# Patient Record
Sex: FEMALE | Race: OTHER
[De-identification: names, ages, dates, MRNs, and addresses within clinical notes are randomized per-mention and may not be internally consistent; named-entity substitution may affect disease eponyms.]

---

## 2019-06-02 ENCOUNTER — HOSPITAL ENCOUNTER (EMERGENCY)
Dept: HOSPITAL 61 - ER | Age: 29
Discharge: HOME | End: 2019-06-02
Payer: COMMERCIAL

## 2019-06-02 VITALS — WEIGHT: 160 LBS | BODY MASS INDEX: 29.44 KG/M2 | HEIGHT: 62 IN

## 2019-06-02 VITALS
DIASTOLIC BLOOD PRESSURE: 62 MMHG | DIASTOLIC BLOOD PRESSURE: 62 MMHG | SYSTOLIC BLOOD PRESSURE: 109 MMHG | SYSTOLIC BLOOD PRESSURE: 109 MMHG | SYSTOLIC BLOOD PRESSURE: 109 MMHG | SYSTOLIC BLOOD PRESSURE: 109 MMHG | DIASTOLIC BLOOD PRESSURE: 62 MMHG | DIASTOLIC BLOOD PRESSURE: 62 MMHG

## 2019-06-02 DIAGNOSIS — Z3A.01: ICD-10-CM

## 2019-06-02 DIAGNOSIS — O20.0: Primary | ICD-10-CM

## 2019-06-02 LAB
ANION GAP SERPL CALC-SCNC: 13 MMOL/L (ref 6–14)
APTT PPP: YELLOW S
BACTERIA #/AREA URNS HPF: (no result) /HPF
BASOPHILS # BLD AUTO: 0.1 X10^3/UL (ref 0–0.2)
BASOPHILS NFR BLD: 1 % (ref 0–3)
BILIRUB UR QL STRIP: NEGATIVE
BUN SERPL-MCNC: 13 MG/DL (ref 7–20)
CALCIUM SERPL-MCNC: 9.4 MG/DL (ref 8.5–10.1)
CHLORIDE SERPL-SCNC: 102 MMOL/L (ref 98–107)
CO2 SERPL-SCNC: 24 MMOL/L (ref 21–32)
CREAT SERPL-MCNC: 0.5 MG/DL (ref 0.6–1)
EOSINOPHIL NFR BLD: 0.1 X10^3/UL (ref 0–0.7)
EOSINOPHIL NFR BLD: 2 % (ref 0–3)
ERYTHROCYTE [DISTWIDTH] IN BLOOD BY AUTOMATED COUNT: 13.1 % (ref 11.5–14.5)
FIBRINOGEN PPP-MCNC: CLEAR MG/DL
GFR SERPLBLD BASED ON 1.73 SQ M-ARVRAT: 145.9 ML/MIN
GLUCOSE SERPL-MCNC: 96 MG/DL (ref 70–99)
HCT VFR BLD CALC: 42 % (ref 36–47)
HGB BLD-MCNC: 14.1 G/DL (ref 12–15.5)
LYMPHOCYTES # BLD: 2 X10^3/UL (ref 1–4.8)
LYMPHOCYTES NFR BLD AUTO: 25 % (ref 24–48)
MCH RBC QN AUTO: 30 PG (ref 25–35)
MCHC RBC AUTO-ENTMCNC: 34 G/DL (ref 31–37)
MCV RBC AUTO: 89 FL (ref 79–100)
MONO #: 0.6 X10^3/UL (ref 0–1.1)
MONOCYTES NFR BLD: 8 % (ref 0–9)
NEUT #: 5.1 X10^3UL (ref 1.8–7.7)
NEUTROPHILS NFR BLD AUTO: 64 % (ref 31–73)
NITRITE UR QL STRIP: NEGATIVE
PH UR STRIP: 6.5 [PH]
PLATELET # BLD AUTO: 290 X10^3/UL (ref 140–400)
POTASSIUM SERPL-SCNC: 4.3 MMOL/L (ref 3.5–5.1)
PROT UR STRIP-MCNC: NEGATIVE MG/DL
RBC # BLD AUTO: 4.7 X10^6/UL (ref 3.5–5.4)
RBC #/AREA URNS HPF: >40 /HPF (ref 0–2)
SODIUM SERPL-SCNC: 139 MMOL/L (ref 136–145)
SQUAMOUS #/AREA URNS LPF: (no result) /LPF
UROBILINOGEN UR-MCNC: 0.2 MG/DL
WBC # BLD AUTO: 7.9 X10^3/UL (ref 4–11)
WBC #/AREA URNS HPF: 0 /HPF (ref 0–4)

## 2019-06-02 PROCEDURE — 86901 BLOOD TYPING SEROLOGIC RH(D): CPT

## 2019-06-02 PROCEDURE — 76817 TRANSVAGINAL US OBSTETRIC: CPT

## 2019-06-02 PROCEDURE — 87491 CHLMYD TRACH DNA AMP PROBE: CPT

## 2019-06-02 PROCEDURE — 99285 EMERGENCY DEPT VISIT HI MDM: CPT

## 2019-06-02 PROCEDURE — 81001 URINALYSIS AUTO W/SCOPE: CPT

## 2019-06-02 PROCEDURE — 87591 N.GONORRHOEAE DNA AMP PROB: CPT

## 2019-06-02 PROCEDURE — 86850 RBC ANTIBODY SCREEN: CPT

## 2019-06-02 PROCEDURE — 80048 BASIC METABOLIC PNL TOTAL CA: CPT

## 2019-06-02 PROCEDURE — 86900 BLOOD TYPING SEROLOGIC ABO: CPT

## 2019-06-02 PROCEDURE — 76801 OB US < 14 WKS SINGLE FETUS: CPT

## 2019-06-02 PROCEDURE — 36415 COLL VENOUS BLD VENIPUNCTURE: CPT

## 2019-06-02 PROCEDURE — 85025 COMPLETE CBC W/AUTO DIFF WBC: CPT

## 2019-06-02 PROCEDURE — 81025 URINE PREGNANCY TEST: CPT

## 2019-06-02 PROCEDURE — 84702 CHORIONIC GONADOTROPIN TEST: CPT

## 2019-06-02 NOTE — PHYS DOC
Adult General


Chief Complaint


Chief Complaint:  VAGINAL BLEEDING PREGNANCY





[facility]


HPI





Patient is a 28  year old Marshallese-speaking female  1 para 0 currently 6 

weeks pregnant presenting to the ED today complaining of vaginal bleeding in 

pregnancy that began today after having sex this morning. Patient is also 

complaining of slight low back cramping. Denies any nausea vomiting. Denies any 

urgency frequency dysuria. Denies any concerns for STDs.





Interpretation was provided by the RN who speaks Marshallese


 (DAVID RICH)





Review of Systems


Review of Systems





Constitutional: Denies fever or chills []


Eyes: Denies change in visual acuity, redness, or eye pain []


HENT: Denies nasal congestion or sore throat []


Respiratory: Denies cough or shortness of breath []


Cardiovascular: No additional information not addressed in HPI []


GI: Reports vaginal bleeding in pregnancy, denies abdominal pain, nausea, 

vomiting, bloody stools or diarrhea []


: Denies dysuria or hematuria []


Musculoskeletal: Denies back pain or joint pain []


Integument: Denies rash or skin lesions []


Neurologic: Denies headache, focal weakness or sensory changes []








All other systems were reviewed and found to be within normal limits, except as 

documented in this note.


 (DAVID RICH)





Allergies


Allergies





Allergies








Coded Allergies Type Severity Reaction Last Updated Verified


 


  No Known Drug Allergies    19 No





 (JETT PIERRE MD)





Physical Exam


Physical Exam





Constitutional: Well developed, well nourished, no acute distress, non-toxic 

appearance. []


HENT: Normocephalic, atraumatic, bilateral external ears normal, oropharynx 

moist, no oral exudates, nose normal. []


Eyes: PERRLA, EOMI, conjunctiva normal, no discharge. [] 


Neck: Normal range of motion, no tenderness, supple, no stridor. [] 


Cardiovascular:Heart rate regular rhythm, no murmur []


Lungs & Thorax:  Bilateral breath sounds clear to auscultation []


Abdomen: Bowel sounds normal, soft, no tenderness, no masses, no pulsatile 

masses. [] 


Pelvic exam


External pelvic appears normal, cervix is visualized, closed, no CMT, no adnexal

 tenderness. Trace amount of bright red blood in the vaginal vault.


Skin: Warm, dry, no erythema, no rash. [] 


Back: No tenderness, no CVA tenderness. [] 


Extremities: No tenderness, no cyanosis, no clubbing, ROM intact, no edema. [] 


Neurologic: Alert and oriented X 3, normal motor function, normal sensory 

function, no focal deficits noted. []


Psychologic: Affect normal, judgement normal, mood normal. []


 (DAVID RICH)





Current Patient Data


Vital Signs





                                   Vital Signs








  Date Time  Temp Pulse Resp B/P (MAP) Pulse Ox O2 Delivery O2 Flow Rate FiO2


 


19 16:30  72 14 113/66 (82) 95 Room Air  


 


19 16:00 98.4       





 98.4       





 (JETT PIERRE MD)


Lab Values





                                Laboratory Tests








Test


 19


15:57 19


16:00 19


16:25


 


POC Urine HCG, Qualitative


 Hcg positive


(Negative) 


 





 


Urine Collection Type  Unknown   


 


Urine Color  Yellow   


 


Urine Clarity  Clear   


 


Urine pH  6.5   


 


Urine Specific Gravity  1.010   


 


Urine Protein


 


 Negative mg/dL


(NEG-TRACE) 





 


Urine Glucose (UA)


 


 Negative mg/dL


(NEG) 





 


Urine Ketones (Stick)


 


 Negative mg/dL


(NEG) 





 


Urine Blood  Large (NEG)   


 


Urine Nitrite


 


 Negative (NEG)


 





 


Urine Bilirubin


 


 Negative (NEG)


 





 


Urine Urobilinogen Dipstick


 


 0.2 mg/dL (0.2


mg/dL) 





 


Urine Leukocyte Esterase


 


 Negative (NEG)


 





 


Urine RBC


 


 >40 /HPF (0-2)


 





 


Urine WBC  0 /HPF (0-4)   


 


Urine Squamous Epithelial


Cells 


 Few /LPF  


 





 


Urine Bacteria


 


 Few /HPF


(0-FEW) 





 


White Blood Count


 


 


 7.9 x10^3/uL


(4.0-11.0)


 


Red Blood Count


 


 


 4.70 x10^6/uL


(3.50-5.40)


 


Hemoglobin


 


 


 14.1 g/dL


(12.0-15.5)


 


Hematocrit


 


 


 42.0 %


(36.0-47.0)


 


Mean Corpuscular Volume


 


 


 89 fL ()





 


Mean Corpuscular Hemoglobin   30 pg (25-35)  


 


Mean Corpuscular Hemoglobin


Concent 


 


 34 g/dL


(31-37)


 


Red Cell Distribution Width


 


 


 13.1 %


(11.5-14.5)


 


Platelet Count


 


 


 290 x10^3/uL


(140-400)


 


Neutrophils (%) (Auto)   64 % (31-73)  


 


Lymphocytes (%) (Auto)   25 % (24-48)  


 


Monocytes (%) (Auto)   8 % (0-9)  


 


Eosinophils (%) (Auto)   2 % (0-3)  


 


Basophils (%) (Auto)   1 % (0-3)  


 


Neutrophils # (Auto)


 


 


 5.1 x10^3uL


(1.8-7.7)


 


Lymphocytes # (Auto)


 


 


 2.0 x10^3/uL


(1.0-4.8)


 


Monocytes # (Auto)


 


 


 0.6 x10^3/uL


(0.0-1.1)


 


Eosinophils # (Auto)


 


 


 0.1 x10^3/uL


(0.0-0.7)


 


Basophils # (Auto)


 


 


 0.1 x10^3/uL


(0.0-0.2)


 


Maternal Serum HCG Beta


Subunit 


 


 90820 mIU/mL


(0-5)  H


 


Sodium Level


 


 


 139 mmol/L


(136-145)


 


Potassium Level


 


 


 4.3 mmol/L


(3.5-5.1)


 


Chloride Level


 


 


 102 mmol/L


()


 


Carbon Dioxide Level


 


 


 24 mmol/L


(21-32)


 


Anion Gap   13 (6-14)  


 


Blood Urea Nitrogen


 


 


 13 mg/dL


(7-20)


 


Creatinine


 


 


 0.5 mg/dL


(0.6-1.0)  L


 


Estimated GFR


(Cockcroft-Gault) 


 


 145.9  





 


Glucose Level


 


 


 96 mg/dL


(70-99)


 


Calcium Level


 


 


 9.4 mg/dL


(8.5-10.1)





                                Laboratory Tests


19 16:25








                                Laboratory Tests


19 16:25








 (JETT PIERRE MD)





EKG


EKG


[]


 (DAVID RICH)





Radiology/Procedures


Radiology/Procedures


[]


 (DAVID RICH)





Course & Med Decision Making


Course & Med Decision Making


Pertinent Labs and Imaging studies reviewed. (See chart for details)





This is a 28-year-old female patient presented to the ED today complaining of 

vaginal bleeding in pregnancy. Last menstrual cycle was 2019. Roughly 

6 weeks pregnant. Bleeding began today after having sex.





Patient has paperwork from her own doctor, she had a beta hCG done on May 21 

2019 which was 156.





OB ultrasound verbal report from radiology was noted for an IUP, heart rate 113.





CBC with normal WBC, normal hemoglobin and hematocrit, BMP with no acute blood 

group O+ urine analysis is negative for infection. Beta hCG 17,247.





Verbal results of wet prep from lab- negative 





Patient has good follow-up. She was instructed to maintain bedrest/pelvic right.

 Follow-up with her OB/GYN in the course of this week.














 (DAVID RICH)


Course & Med Decision Making





Staff Physician Addendum:


I was working in the ER during the course of this patient's visit.  I was 

available for consultation as needed, but I was not directly involved in the 

care of this patient.    


 (JETT PIERRE MD)


Dragon Disclaimer


Dragon Disclaimer


This electronic medical record was generated, in whole or in part, using a voice

 recognition dictation system.


 (DAVID RICH)





Departure


Departure


Impression:  


   Primary Impression:  


   Threatened miscarriage


Disposition:  01 HOME, SELF-CARE


Condition:  STABLE


Referrals:  


CATRACHITO HANDY Jr, MD


Follow-up with your OB/GYN or the provided OB/GYN in the course of this week


Patient Instructions:  Threatened Miscarriage, Easy-to-Read





Additional Instructions:  


You were evaluated in the emergency room and noted to be 6 weeks pregnant, we 

highly recommend you maintain pelvic this includes no sex, no strenous 

activities until seen by the OBGYN or your own doctor. Set up an appointment 

with your doctor as soon as you can.











DAVID RICH               2019 16:10


JETT PIERRE MD             2019 18:50

## 2019-06-03 NOTE — RAD
Examination: Obstetric ultrasound less than 14 weeks

 

HISTORY: History of vaginal bleeding

 

COMPARISON: None available

 

FINDINGS:

 

 

Uterus measures 7.8 x 5.5 x 3.9 cm. Cervical length measures 3 cm.

 

The right ovary measures 3.5 x 2.4 x 1.7 cm. Small corpus luteal cyst 

identified in the right ovary.

 

The left ovary measures 3.0 x 2.2 x 1.3 cm.

 

Intrauterine gestational sac and yolk sac identified. Fetal heart rate is 

113 bpm.

 

Gestational sac measures 1.2 cm corresponding to 6 weeks and 0 days. 

Crown-rump length measures 2.2 mm corresponding to 5 weeks and 5 days.

 

Gestational age by ultrasound is 5 weeks and 6 days with estimated 

delivery by ultrasound 1/27/2020.

 

Impression:

 

Single living intrauterine pregnancy.

 

Electronically signed by: Luis Antonio Flores MD (6/3/2019 11:20 AM) [facility]

## 2019-08-16 ENCOUNTER — HOSPITAL ENCOUNTER (OUTPATIENT)
Dept: HOSPITAL 61 - US | Age: 29
Discharge: HOME | End: 2019-08-16
Attending: OBSTETRICS & GYNECOLOGY
Payer: COMMERCIAL

## 2019-08-16 DIAGNOSIS — O26.842: Primary | ICD-10-CM

## 2019-08-16 DIAGNOSIS — Z3A.17: ICD-10-CM

## 2019-08-16 PROCEDURE — 76805 OB US >/= 14 WKS SNGL FETUS: CPT

## 2019-08-16 NOTE — RAD
Clinical indications: Uncertain gestational age. Evaluation for size and 

dates. 

 

Findings: A single intrauterine fetus is seen in breech position. Fetal 

heart rate is 185 beats per minute. 

BPD is 3.7 cm which equals 17 weeks 2 days.

HC is 13.50 cm which equals 17  weeks 0 days.

AC is 11.58 cm which equals 17  weeks 2 days.

FL is 2.42 cm which equals 17  weeks 2 days.

Average gestational age by ultrasound is 17 weeks 2 days +/- 10 days with 

an EDC of January 22, 2020.

Estimated fetal weight is 0 lbs and 7 oz.

 

The fetal anatomy is difficult to evaluate due to the early stage of 

gestation and decreased resolution on this study.

A four-chamber heart is identified.

Fetal stomach and urinary bladder are identified.

Fetal kidneys are unremarkable.

Cord insertion site is unremarkable.

The resolution of the intracranial structures and spine is decreased. 

Four extremities are identified.

 

Normal amount of amniotic fluid is evident.

Cervical length is 3.6 cm.

A grade 0 anterior placenta is seen. No placenta previa and no placenta 

abruptio is identified.

The maternal ovaries are not visualized.

 

Impression: Single IUP with gestational age of 17 weeks 2 days. Fetal 

heart rate is 185 bpm.

 

Electronically signed by: Ric Archibald MD (8/16/2019 5:25 PM) [facility]

## 2019-10-22 ENCOUNTER — HOSPITAL ENCOUNTER (OUTPATIENT)
Dept: HOSPITAL 61 - LAB | Age: 29
Discharge: HOME | End: 2019-10-22
Attending: OBSTETRICS & GYNECOLOGY
Payer: COMMERCIAL

## 2019-10-22 DIAGNOSIS — O09.92: Primary | ICD-10-CM

## 2019-10-22 DIAGNOSIS — Z3A.26: ICD-10-CM

## 2019-10-22 LAB
BASOPHILS # BLD AUTO: 0.1 X10^3/UL (ref 0–0.2)
BASOPHILS NFR BLD: 1 % (ref 0–3)
EOSINOPHIL NFR BLD: 0.1 X10^3/UL (ref 0–0.7)
EOSINOPHIL NFR BLD: 1 % (ref 0–3)
ERYTHROCYTE [DISTWIDTH] IN BLOOD BY AUTOMATED COUNT: 13.4 % (ref 11.5–14.5)
HCT VFR BLD CALC: 39.2 % (ref 36–47)
HGB BLD-MCNC: 13.6 G/DL (ref 12–15.5)
LYMPHOCYTES # BLD: 1.4 X10^3/UL (ref 1–4.8)
LYMPHOCYTES NFR BLD AUTO: 15 % (ref 24–48)
MCH RBC QN AUTO: 32 PG (ref 25–35)
MCHC RBC AUTO-ENTMCNC: 35 G/DL (ref 31–37)
MCV RBC AUTO: 92 FL (ref 79–100)
MONO #: 0.6 X10^3/UL (ref 0–1.1)
MONOCYTES NFR BLD: 6 % (ref 0–9)
NEUT #: 7.3 X10^3/UL (ref 1.8–7.7)
NEUTROPHILS NFR BLD AUTO: 78 % (ref 31–73)
PLATELET # BLD AUTO: 246 X10^3/UL (ref 140–400)
RBC # BLD AUTO: 4.24 X10^6/UL (ref 3.5–5.4)
WBC # BLD AUTO: 9.4 X10^3/UL (ref 4–11)

## 2019-10-22 PROCEDURE — 82950 GLUCOSE TEST: CPT

## 2019-10-22 PROCEDURE — 36415 COLL VENOUS BLD VENIPUNCTURE: CPT

## 2019-10-22 PROCEDURE — 85025 COMPLETE CBC W/AUTO DIFF WBC: CPT

## 2019-11-20 ENCOUNTER — HOSPITAL ENCOUNTER (OUTPATIENT)
Dept: HOSPITAL 61 - 3 SO LND | Age: 29
Setting detail: OBSERVATION
Discharge: HOME | End: 2019-11-20
Attending: OBSTETRICS & GYNECOLOGY | Admitting: OBSTETRICS & GYNECOLOGY
Payer: MEDICAID

## 2019-11-20 DIAGNOSIS — Z3A.28: ICD-10-CM

## 2019-11-20 DIAGNOSIS — O24.419: Primary | ICD-10-CM

## 2019-11-20 DIAGNOSIS — O21.2: ICD-10-CM

## 2019-11-20 LAB
APTT PPP: YELLOW S
BACTERIA #/AREA URNS HPF: (no result) /HPF
BILIRUB UR QL STRIP: NEGATIVE
FIBRINOGEN PPP-MCNC: CLEAR MG/DL
NITRITE UR QL STRIP: NEGATIVE
PH UR STRIP: 8 [PH]
PROT UR STRIP-MCNC: 30 MG/DL
RBC #/AREA URNS HPF: 0 /HPF (ref 0–2)
SQUAMOUS #/AREA URNS LPF: (no result) /LPF
UROBILINOGEN UR-MCNC: 1 MG/DL
WBC #/AREA URNS HPF: (no result) /HPF (ref 0–4)

## 2019-11-20 PROCEDURE — 82962 GLUCOSE BLOOD TEST: CPT

## 2019-11-20 PROCEDURE — G0379 DIRECT REFER HOSPITAL OBSERV: HCPCS

## 2019-11-20 PROCEDURE — 96360 HYDRATION IV INFUSION INIT: CPT

## 2019-11-20 PROCEDURE — 81001 URINALYSIS AUTO W/SCOPE: CPT

## 2019-11-20 PROCEDURE — 87086 URINE CULTURE/COLONY COUNT: CPT

## 2019-11-20 PROCEDURE — 96361 HYDRATE IV INFUSION ADD-ON: CPT

## 2019-11-20 PROCEDURE — G0378 HOSPITAL OBSERVATION PER HR: HCPCS

## 2019-11-20 RX ADMIN — SODIUM CHLORIDE, SODIUM LACTATE, POTASSIUM CHLORIDE, AND CALCIUM CHLORIDE PRN MLS/HR: .6; .31; .03; .02 INJECTION, SOLUTION INTRAVENOUS at 13:00

## 2019-11-20 RX ADMIN — SODIUM CHLORIDE, SODIUM LACTATE, POTASSIUM CHLORIDE, AND CALCIUM CHLORIDE PRN MLS/HR: .6; .31; .03; .02 INJECTION, SOLUTION INTRAVENOUS at 11:41

## 2019-11-25 ENCOUNTER — HOSPITAL ENCOUNTER (OUTPATIENT)
Dept: HOSPITAL 61 - US | Age: 29
Discharge: HOME | End: 2019-11-25
Attending: OBSTETRICS & GYNECOLOGY
Payer: MEDICAID

## 2019-11-25 DIAGNOSIS — O24.419: ICD-10-CM

## 2019-11-25 DIAGNOSIS — Z3A.29: ICD-10-CM

## 2019-11-25 DIAGNOSIS — O26.843: Primary | ICD-10-CM

## 2019-11-25 PROCEDURE — 76805 OB US >/= 14 WKS SNGL FETUS: CPT

## 2019-12-20 ENCOUNTER — HOSPITAL ENCOUNTER (OUTPATIENT)
Dept: HOSPITAL 61 - 3 SO LND | Age: 29
Setting detail: OBSERVATION
Discharge: HOME | End: 2019-12-20
Attending: OBSTETRICS & GYNECOLOGY | Admitting: OBSTETRICS & GYNECOLOGY
Payer: MEDICAID

## 2019-12-20 ENCOUNTER — HOSPITAL ENCOUNTER (EMERGENCY)
Dept: HOSPITAL 61 - ER | Age: 29
Discharge: HOME | End: 2019-12-20
Payer: MEDICAID

## 2019-12-20 VITALS
DIASTOLIC BLOOD PRESSURE: 66 MMHG | DIASTOLIC BLOOD PRESSURE: 66 MMHG | DIASTOLIC BLOOD PRESSURE: 66 MMHG | DIASTOLIC BLOOD PRESSURE: 66 MMHG | SYSTOLIC BLOOD PRESSURE: 114 MMHG | DIASTOLIC BLOOD PRESSURE: 66 MMHG | SYSTOLIC BLOOD PRESSURE: 114 MMHG | SYSTOLIC BLOOD PRESSURE: 114 MMHG | SYSTOLIC BLOOD PRESSURE: 114 MMHG | DIASTOLIC BLOOD PRESSURE: 66 MMHG | SYSTOLIC BLOOD PRESSURE: 114 MMHG | SYSTOLIC BLOOD PRESSURE: 114 MMHG

## 2019-12-20 VITALS — BODY MASS INDEX: 30.56 KG/M2 | WEIGHT: 179 LBS | HEIGHT: 64 IN

## 2019-12-20 DIAGNOSIS — Z3A.32: ICD-10-CM

## 2019-12-20 DIAGNOSIS — E16.2: ICD-10-CM

## 2019-12-20 DIAGNOSIS — Z86.32: ICD-10-CM

## 2019-12-20 DIAGNOSIS — O24.419: Primary | ICD-10-CM

## 2019-12-20 DIAGNOSIS — R42: ICD-10-CM

## 2019-12-20 DIAGNOSIS — O26.893: Primary | ICD-10-CM

## 2019-12-20 DIAGNOSIS — G43.909: ICD-10-CM

## 2019-12-20 LAB
ALBUMIN SERPL-MCNC: 2.9 G/DL (ref 3.4–5)
ALBUMIN/GLOB SERPL: 0.7 {RATIO} (ref 1–1.7)
ALP SERPL-CCNC: 106 U/L (ref 46–116)
ALT SERPL-CCNC: 15 U/L (ref 14–59)
ANION GAP SERPL CALC-SCNC: 13 MMOL/L (ref 6–14)
APTT PPP: YELLOW S
AST SERPL-CCNC: 14 U/L (ref 15–37)
BACTERIA #/AREA URNS HPF: (no result) /HPF
BASOPHILS # BLD AUTO: 0 X10^3/UL (ref 0–0.2)
BASOPHILS NFR BLD: 1 % (ref 0–3)
BILIRUB SERPL-MCNC: 2.1 MG/DL (ref 0.2–1)
BILIRUB UR QL STRIP: NEGATIVE
BUN SERPL-MCNC: 5 MG/DL (ref 7–20)
BUN/CREAT SERPL: 10 (ref 6–20)
CALCIUM SERPL-MCNC: 9.4 MG/DL (ref 8.5–10.1)
CHLORIDE SERPL-SCNC: 105 MMOL/L (ref 98–107)
CO2 SERPL-SCNC: 22 MMOL/L (ref 21–32)
CREAT SERPL-MCNC: 0.5 MG/DL (ref 0.6–1)
EOSINOPHIL NFR BLD: 0.1 X10^3/UL (ref 0–0.7)
EOSINOPHIL NFR BLD: 1 % (ref 0–3)
ERYTHROCYTE [DISTWIDTH] IN BLOOD BY AUTOMATED COUNT: 13.5 % (ref 11.5–14.5)
FIBRINOGEN PPP-MCNC: CLEAR MG/DL
GFR SERPLBLD BASED ON 1.73 SQ M-ARVRAT: 144.9 ML/MIN
GLOBULIN SER-MCNC: 4.2 G/DL (ref 2.2–3.8)
GLUCOSE SERPL-MCNC: 55 MG/DL (ref 70–99)
HCT VFR BLD CALC: 42.7 % (ref 36–47)
HGB BLD-MCNC: 14.8 G/DL (ref 12–15.5)
LYMPHOCYTES # BLD: 2.1 X10^3/UL (ref 1–4.8)
LYMPHOCYTES NFR BLD AUTO: 24 % (ref 24–48)
MAGNESIUM SERPL-MCNC: 1.9 MG/DL (ref 1.8–2.4)
MCH RBC QN AUTO: 32 PG (ref 25–35)
MCHC RBC AUTO-ENTMCNC: 35 G/DL (ref 31–37)
MCV RBC AUTO: 92 FL (ref 79–100)
MONO #: 0.8 X10^3/UL (ref 0–1.1)
MONOCYTES NFR BLD: 9 % (ref 0–9)
NEUT #: 5.8 X10^3/UL (ref 1.8–7.7)
NEUTROPHILS NFR BLD AUTO: 66 % (ref 31–73)
NITRITE UR QL STRIP: NEGATIVE
PH UR STRIP: 7 [PH]
PLATELET # BLD AUTO: 240 X10^3/UL (ref 140–400)
POTASSIUM SERPL-SCNC: 3.6 MMOL/L (ref 3.5–5.1)
PROT SERPL-MCNC: 7.1 G/DL (ref 6.4–8.2)
PROT UR STRIP-MCNC: NEGATIVE MG/DL
RBC # BLD AUTO: 4.63 X10^6/UL (ref 3.5–5.4)
RBC #/AREA URNS HPF: (no result) /HPF (ref 0–2)
SODIUM SERPL-SCNC: 140 MMOL/L (ref 136–145)
SQUAMOUS #/AREA URNS LPF: (no result) /LPF
UROBILINOGEN UR-MCNC: 0.2 MG/DL
WBC # BLD AUTO: 8.7 X10^3/UL (ref 4–11)
WBC #/AREA URNS HPF: (no result) /HPF (ref 0–4)

## 2019-12-20 PROCEDURE — 82962 GLUCOSE BLOOD TEST: CPT

## 2019-12-20 PROCEDURE — 76815 OB US LIMITED FETUS(S): CPT

## 2019-12-20 PROCEDURE — 85025 COMPLETE CBC W/AUTO DIFF WBC: CPT

## 2019-12-20 PROCEDURE — G0378 HOSPITAL OBSERVATION PER HR: HCPCS

## 2019-12-20 PROCEDURE — 96361 HYDRATE IV INFUSION ADD-ON: CPT

## 2019-12-20 PROCEDURE — 80053 COMPREHEN METABOLIC PANEL: CPT

## 2019-12-20 PROCEDURE — 59025 FETAL NON-STRESS TEST: CPT

## 2019-12-20 PROCEDURE — 83735 ASSAY OF MAGNESIUM: CPT

## 2019-12-20 PROCEDURE — 81001 URINALYSIS AUTO W/SCOPE: CPT

## 2019-12-20 PROCEDURE — G0379 DIRECT REFER HOSPITAL OBSERV: HCPCS

## 2019-12-20 PROCEDURE — 36415 COLL VENOUS BLD VENIPUNCTURE: CPT

## 2019-12-20 PROCEDURE — 96374 THER/PROPH/DIAG INJ IV PUSH: CPT

## 2019-12-20 PROCEDURE — 99284 EMERGENCY DEPT VISIT MOD MDM: CPT

## 2019-12-20 NOTE — RAD
Biophysical profile:

 

Clinical indications: Gestational diabetes.

 

Findings: A single intrauterine pregnancy is present in the cephalic 

position. Fetal heart rate is 130.

Fetal breathing movements: 2.

Fetal motion: 2.

Fetal tone: 2.

Amniotic fluid volume: 2.

Therefore, the biophysical profile score is 8 out of 8.

Cervical length-not visualized.

RON using the 4 quadrant method is 12.4 cm.

 

 

Impression: Biophysical profile score is 8 out of 8.

 

Electronically signed by: Ric Archibald MD (12/20/2019 12:48 PM) 

[facility]

## 2019-12-20 NOTE — PHYS DOC
Past Medical History


Past Medical History:  Migraines


Additional Past Medical Histor:  gestational diabetes


Past Surgical History:  Tonsillectomy


Additional Information:  


Nonsmoker


Alcohol Use:  None


Drug Use:  None





Adult General


Chief Complaint


Chief Complaint:  DIZZY/LIGHT HEADED





HPI


HPI





29-year-old female  presents at approximately 32 weeks with report of 

dizziness and generalized malaise. Patient diagnosed with gestational diabetes 

and was recently doubled her dose of glyburide. Patient has been taking her 

blood sugars and recording in her journal and noted her blood sugar dropped down

to the 60s. Patient denies eating anything. Reports she feels dehydrated. Tracey

t reports she did drink some water prior to arrival. Denies dysuria. Denies 

vaginal bleeding. Reports child has been moving like normal.





Review of Systems


Review of Systems





Constitutional: Denies fever or chills; reports generalized malaise


Eyes: Denies redness or eye pain 


HENT: Denies nasal congestion or sore throat


Respiratory: Denies cough or shortness of breath 


Cardiovascular: Denies chest pain or palpitations


GI: Denies abdominal pain, nausea, or vomiting


: Denies dysuria or hematuria


Musculoskeletal: Denies back pain or joint pain


Integument: Denies rash or skin lesions 


Neurologic: Denies headache, focal weakness or sensory changes; reports 

dizziness





Complete systems were reviewed and found to be within normal limits, except as 

documented in this note.





Current Medications


Current Medications





Current Medications








 Medications


  (Trade)  Dose


 Ordered  Sig/Robbi  Start Time


 Stop Time Status Last Admin


Dose Admin


 


 Dextrose


  (Dextrose


 50%-Water Syringe)  25 gm  1X  ONCE  19 05:30


 19 05:31 DC 19 05:02


25 GM


 


 Sodium Chloride  1,000 ml @ 


 1,000 mls/hr  1X  ONCE  19 05:00


 19 05:59  19 05:02


1,000 MLS/HR











Allergies


Allergies





Allergies








Coded Allergies Type Severity Reaction Last Updated Verified


 


  No Known Drug Allergies    10/22/19 No











Physical Exam


Physical Exam


Constitutional: Well developed, well nourished, no acute distress, non-toxic 

appearance


HENT: Normocephalic, atraumatic, oropharynx moist


Eyes: PERRL, EOMI, conjunctiva normal, no discharge


Neck: Normal range of motion, no tenderness, supple


Cardiovascular: Heart rate normal, regular rhythm


Lungs & Thorax:  Bilateral breath sounds clear to auscultation, no wheezing


Abdomen: Soft, no tenderness, gravid abdomen


Skin: Warm, dry, no erythema, no rash


Extremities: No tenderness, ROM intact, no edema


Neurologic: Alert and oriented X 3, no focal deficits noted


Psychologic: Affect normal, judgement normal





Current Patient Data


Lab Values





                                Laboratory Tests








Test


 19


04:45 19


05:05


 


Urine Collection Type Unknown   


 


Urine Color Yellow   


 


Urine Clarity Clear   


 


Urine pH 7.0   


 


Urine Specific Gravity <=1.005   


 


Urine Protein


 Negative mg/dL


(NEG-TRACE) 





 


Urine Glucose (UA)


 Negative mg/dL


(NEG) 





 


Urine Ketones (Stick)


 Negative mg/dL


(NEG) 





 


Urine Blood


 Negative (NEG)


 





 


Urine Nitrite


 Negative (NEG)


 





 


Urine Bilirubin


 Negative (NEG)


 





 


Urine Urobilinogen Dipstick


 0.2 mg/dL (0.2


mg/dL) 





 


Urine Leukocyte Esterase


 Negative (NEG)


 





 


Urine RBC


 1-2 /HPF (0-2)


 





 


Urine WBC


 1-4 /HPF (0-4)


 





 


Urine Squamous Epithelial


Cells Mod /LPF  


 





 


Urine Bacteria


 Few /HPF


(0-FEW) 





 


Urine Mucus Slight /LPF   


 


White Blood Count


 


 8.7 x10^3/uL


(4.0-11.0)


 


Red Blood Count


 


 4.63 x10^6/uL


(3.50-5.40)


 


Hemoglobin


 


 14.8 g/dL


(12.0-15.5)


 


Hematocrit


 


 42.7 %


(36.0-47.0)


 


Mean Corpuscular Volume


 


 92 fL ()





 


Mean Corpuscular Hemoglobin  32 pg (25-35)  


 


Mean Corpuscular Hemoglobin


Concent 


 35 g/dL


(31-37)


 


Red Cell Distribution Width


 


 13.5 %


(11.5-14.5)


 


Platelet Count


 


 240 x10^3/uL


(140-400)


 


Neutrophils (%) (Auto)  66 % (31-73)  


 


Lymphocytes (%) (Auto)  24 % (24-48)  


 


Monocytes (%) (Auto)  9 % (0-9)  


 


Eosinophils (%) (Auto)  1 % (0-3)  


 


Basophils (%) (Auto)  1 % (0-3)  


 


Neutrophils # (Auto)


 


 5.8 x10^3/uL


(1.8-7.7)


 


Lymphocytes # (Auto)


 


 2.1 x10^3/uL


(1.0-4.8)


 


Monocytes # (Auto)


 


 0.8 x10^3/uL


(0.0-1.1)


 


Eosinophils # (Auto)


 


 0.1 x10^3/uL


(0.0-0.7)


 


Basophils # (Auto)


 


 0.0 x10^3/uL


(0.0-0.2)


 


Sodium Level


 


 140 mmol/L


(136-145)


 


Potassium Level


 


 3.6 mmol/L


(3.5-5.1)


 


Chloride Level


 


 105 mmol/L


()


 


Carbon Dioxide Level


 


 22 mmol/L


(21-32)


 


Anion Gap  13 (6-14)  


 


Blood Urea Nitrogen


 


 5 mg/dL (7-20)


L


 


Creatinine


 


 0.5 mg/dL


(0.6-1.0)  L


 


Estimated GFR


(Cockcroft-Gault) 


 144.9  





 


BUN/Creatinine Ratio  10 (6-20)  


 


Glucose Level


 


 55 mg/dL


(70-99)  L


 


Calcium Level


 


 9.4 mg/dL


(8.5-10.1)


 


Magnesium Level


 


 1.9 mg/dL


(1.8-2.4)


 


Total Bilirubin


 


 2.1 mg/dL


(0.2-1.0)  H


 


Aspartate Amino Transferase


(AST) 


 14 U/L (15-37)


L


 


Alanine Aminotransferase (ALT)


 


 15 U/L (14-59)





 


Alkaline Phosphatase


 


 106 U/L


()


 


Total Protein


 


 7.1 g/dL


(6.4-8.2)


 


Albumin


 


 2.9 g/dL


(3.4-5.0)  L


 


Albumin/Globulin Ratio


 


 0.7 (1.0-1.7)


L





                                Laboratory Tests


19 05:05








                                Laboratory Tests


19 05:05











EKG


EKG


[]





Radiology/Procedures


Radiology/Procedures


[]





Course & Med Decision Making


Course & Med Decision Making


Pertinent Lab  studies reviewed. (See chart for details)





Patient presents as  with history of gestational diabetes and was recently 

increased on her dose of glyburide. Patient noted her blood sugars to drop down 

to the 60s in the last 24 hours. Accu-Chek obtained and in the 40s. Dextrose 

provided. IV fluid hydration given. Labs obtained and posted to chart. UA 

without signs of infection. Patient given oral intake. Patient advised to 

decrease back to original dose and follow closely with OB. Fetal heart tones 

normal.





Patient stable for discharge with outpatient follow-up with PCP/OB. Discussed 

findings and plan with patient and family, who acknowledge understanding and 

agreement.





Dragon Disclaimer


Dragon Disclaimer


This electronic medical record was generated, in whole or in part, using a voice

 recognition dictation system.





Departure


Departure


Impression:  


   Primary Impression:  


   Hypoglycemia


   Additional Impressions:  


   Hx gestational diabetes


   Pregnancy


Disposition:  01 HOME, SELF-CARE


Condition:  IMPROVED


Referrals:  


FARIBA HAYS MD (PCP)


Patient Instructions:  ABCs of Pregnancy, Gestational Diabetes Mellitus, 

Hypoglycemia (Low Blood Sugar)





Additional Instructions:  


Decreased your glyburide (diabetes medication) back to previous doses and follow

 closely with your OB for further evaluation and adjustment of your medications.





Problem Qualifiers








   Additional Impressions:  


   Pregnancy


   Weeks of gestation:  32 weeks  Qualified Codes:  Z3A.32 - 32 weeks gestation 

   of pregnancy








BHAVNA ANDRE DO             Dec 20, 2019 04:51

## 2019-12-24 ENCOUNTER — HOSPITAL ENCOUNTER (OUTPATIENT)
Dept: HOSPITAL 61 - 3 SO LND | Age: 29
Setting detail: OBSERVATION
Discharge: HOME | End: 2019-12-24
Attending: OBSTETRICS & GYNECOLOGY | Admitting: OBSTETRICS & GYNECOLOGY
Payer: MEDICAID

## 2019-12-24 DIAGNOSIS — Z3A.33: ICD-10-CM

## 2019-12-24 DIAGNOSIS — O24.414: Primary | ICD-10-CM

## 2019-12-24 PROCEDURE — 59025 FETAL NON-STRESS TEST: CPT

## 2019-12-24 PROCEDURE — 76819 FETAL BIOPHYS PROFIL W/O NST: CPT

## 2019-12-24 PROCEDURE — G0378 HOSPITAL OBSERVATION PER HR: HCPCS

## 2019-12-24 PROCEDURE — G0379 DIRECT REFER HOSPITAL OBSERV: HCPCS

## 2019-12-24 NOTE — RAD
EXAM: OBSTETRIC ULTRASOUND WITH BIOPHYSICAL PROFILE.

 

HISTORY: Gestational diabetes.

 

COMPARISON: None.

 

FINDINGS: Sonographic evaluation of the uterus, fetus and maternal pelvis 

was performed with biophysical profile.

 

There is a single fetus in vertex presentation. Fetal heart rate is 145 

bpm. 

 

The placenta is anterior. There is no evidence of placenta previa. 

Amniotic fluid volume appears normal with amniotic fluid index 14.8 cm.

 

Biophysical profile score: 8/8.

Fetal breathin.

Fetal tone: 2.

Fetal movement: 2.

Amniotic fluid volume: 2.

 

The maternal adnexa are obscured by positioning currently.

 

IMPRESSION: 

1. Biophysical profile score: 8/8.

2. Single fetus in vertex presentation. Fetal heart rate 145 bpm. 

 

Electronically signed by: ZACK Hinkle MD (2019 12:04 PM) 

[facility]

## 2019-12-31 ENCOUNTER — HOSPITAL ENCOUNTER (OUTPATIENT)
Dept: HOSPITAL 61 - 3 SO LND | Age: 29
Setting detail: OBSERVATION
Discharge: HOME | End: 2019-12-31
Attending: OBSTETRICS & GYNECOLOGY | Admitting: OBSTETRICS & GYNECOLOGY
Payer: MEDICAID

## 2019-12-31 DIAGNOSIS — Z34.83: Primary | ICD-10-CM

## 2019-12-31 DIAGNOSIS — Z3A.34: ICD-10-CM

## 2019-12-31 LAB
AMORPH SED URNS QL MICRO: PRESENT /HPF
APTT PPP: YELLOW S
BACTERIA #/AREA URNS HPF: (no result) /HPF
BILIRUB UR QL STRIP: NEGATIVE
FIBRINOGEN PPP-MCNC: (no result) MG/DL
NITRITE UR QL STRIP: NEGATIVE
PH UR STRIP: 8 [PH]
PROT UR STRIP-MCNC: NEGATIVE MG/DL
RBC #/AREA URNS HPF: (no result) /HPF (ref 0–2)
SQUAMOUS #/AREA URNS LPF: (no result) /LPF
UROBILINOGEN UR-MCNC: 0.2 MG/DL
WBC #/AREA URNS HPF: (no result) /HPF (ref 0–4)

## 2019-12-31 PROCEDURE — 59025 FETAL NON-STRESS TEST: CPT

## 2019-12-31 PROCEDURE — 76819 FETAL BIOPHYS PROFIL W/O NST: CPT

## 2019-12-31 PROCEDURE — G0378 HOSPITAL OBSERVATION PER HR: HCPCS

## 2019-12-31 PROCEDURE — 81001 URINALYSIS AUTO W/SCOPE: CPT

## 2019-12-31 PROCEDURE — G0379 DIRECT REFER HOSPITAL OBSERV: HCPCS

## 2019-12-31 NOTE — RAD
Examination: BIOPHYS PROFILE W/O NON STRESS

 

History: Gestational diabetes

 

Comparison/Correlation: None

 

Findings: Biophysical profile score for fetal breathing movements, motion,

tone, and amniotic fluid volume are each 2/2.

 

Anteriorly located placenta is present. Cephalic lie noted. Amniotic fluid

index is 9.4 cm. Fetal heart rate is 165 bpm.

 

 

Impression:

Biophysical profile score of 8/8.

 

Electronically signed by: Brian Young MD (12/31/2019 12:50 PM) [facility]

## 2020-01-07 ENCOUNTER — HOSPITAL ENCOUNTER (OUTPATIENT)
Dept: HOSPITAL 61 - 3 SO LND | Age: 30
Setting detail: OBSERVATION
Discharge: HOME | End: 2020-01-07
Attending: OBSTETRICS & GYNECOLOGY | Admitting: OBSTETRICS & GYNECOLOGY
Payer: MEDICAID

## 2020-01-07 DIAGNOSIS — O24.415: Primary | ICD-10-CM

## 2020-01-07 DIAGNOSIS — Z3A.35: ICD-10-CM

## 2020-01-07 PROCEDURE — 76819 FETAL BIOPHYS PROFIL W/O NST: CPT

## 2020-01-07 PROCEDURE — G0379 DIRECT REFER HOSPITAL OBSERV: HCPCS

## 2020-01-07 PROCEDURE — G0378 HOSPITAL OBSERVATION PER HR: HCPCS

## 2020-01-07 PROCEDURE — 59025 FETAL NON-STRESS TEST: CPT

## 2020-01-08 NOTE — RAD
Ultrasound biophysical profile score

 

HISTORY: Gestational diabetes.

 

COMPARISON: Left physical profile December 31, 2019.

 

FINDINGS: Fetal respiratory movement score 2/2.

 

Fetal motion score 2/2. 

 

Fetal muscle tone score 2/2.

 

Amniotic fluid volume score 2/2.

 

Amniotic fluid index 8.6 cm. Fetal heart rate 149 bpm. Fetus in cephalic 

position. Anterior placenta. Cervix and ovaries not documented.

 

IMPRESSION: Fetal biophysical profile score 8/8.

 

Electronically signed by: Oneil Hu MD (1/8/2020 12:19 AM) 

[facility]

## 2020-01-14 ENCOUNTER — HOSPITAL ENCOUNTER (OUTPATIENT)
Dept: HOSPITAL 61 - 3 SO LND | Age: 30
Setting detail: OBSERVATION
Discharge: HOME | End: 2020-01-14
Attending: OBSTETRICS & GYNECOLOGY | Admitting: OBSTETRICS & GYNECOLOGY
Payer: MEDICAID

## 2020-01-14 DIAGNOSIS — Z3A.36: ICD-10-CM

## 2020-01-14 DIAGNOSIS — O24.419: Primary | ICD-10-CM

## 2020-01-14 PROCEDURE — 59025 FETAL NON-STRESS TEST: CPT

## 2020-01-14 PROCEDURE — G0379 DIRECT REFER HOSPITAL OBSERV: HCPCS

## 2020-01-14 PROCEDURE — G0378 HOSPITAL OBSERVATION PER HR: HCPCS

## 2020-01-14 PROCEDURE — 76819 FETAL BIOPHYS PROFIL W/O NST: CPT

## 2020-01-14 NOTE — RAD
Biophysical profile 1/14/2020

 

Clinical History: Gestational diabetes. 

 

Technique: A real-time ultrasound examination of the gravid uterus was 

performed over a 30 minute period of observation by the ultrasound 

technologist. The following fetal parameters were scored at 2 point scale:

Fetal respiration, fetal tone, fetal breathing and quantitative amniotic 

fluid volume. Multiple images were obtained.

 

Findings:  There is a single living IUP. The fetus is in a cephalic 

position. The placenta is anterior. Amniotic fluid volume is within normal

limits. Fetal heart rate is 125 bpm. The RON measures 9.6 cm.

 

Fetal respiration, fetal movement, fetal tone and qualitative amniotic 

fluid volume score 2 out of 2 points for an 8 out of 8 biophysical 

profile.

 

Impression: 8 out of 8 biophysical profile.

 

 

Electronically signed by: Beto Chi MD (1/14/2020 5:36 PM) [facility]

## 2024-05-29 NOTE — RAD
-- DO NOT REPLY / DO NOT REPLY ALL --  -- This inbox is not monitored  -- Message is from [employer] ([employer]) --    General Patient Message: Patient calling back for her xray results. She is in a lot of pain and wants to know what to do. She is having stabbing pain.   Caller Information         Type Contact Phone/Fax    05/28/2024 09:48 AM CDT Phone (Incoming) Regina Roa (Self) [phone] (M)    05/28/2024 04:48 PM CDT Phone (Incoming) Regina Roa (Self) [phone] (M)    05/29/2024 09:13 AM CDT Phone (Incoming) Regina Roa (Self) [phone] (M)            Alternative phone number: [phone]    Can a detailed message be left? Yes - Voicemail on alternative phone only       Patient has been advised the message will be addressed within 2-3 business days.             Study: PREG MORE THAN OR EQ TO 14 WKS 

 

Clinical Indication: Gestational diabetes. Evaluate estimated fetal weight

and RON. 

 

Comparison: 8/16/2019

 

Technique: Multiple grayscale images, color Doppler, and M-mode images of 

the uterus are obtained.

 

Findings:

 

There is a single intrauterine gestation in cephalic presentation. The 

placenta is anterior in location without evidence of placenta previa. The 

amount of amniotic fluid appears appropriate.  Amniotic fluid index is 

16.7 cm. Cervical length is 4.2 cm.  

 

Biometrical data:

 

BPD = 7.68 cm for 30 weeks 6 days.

HC   = 28.03 cm for 30 weeks 5 days.

AC   = 26.67 cm for 30 weeks 5 days.

FL    = 5.86 cm for  30 weeks 4 days.

CI ratio = 80.6.

HC/AC ratio = 1.05.

FL/HC ratio = 20.9.

FL/AC ratio = 22.

 

Overall, the estimated sonographic gestational age is 30 weeks 5 days The 

estimated gestational age by the last menstrual period is 29 weeks 3 days.

Estimated fetal weight is 1626 grams +/- 241 g.

 

A 4 chamber heart is identified with positive cardiac activity. The 

estimated fetal heart rate is 143 beats per minute. 

 

Bilateral upper and lower extremities are identified. 

 

Fetal stomach and urinary bladder are identified. Both kidneys are seen.  

 

Impression:

 

1. Single live intrauterine pregnancy with an estimated gestational age by

sonography of 30 weeks 5 days. Estimated gestational age by last menstrual

period is 29 weeks 3 days. Estimated fetal weight is 1626 g.

2. Anterior placenta without previa. Amniotic fluid index is within normal

limits at 16.7 cm. The cervix is closed and measures 4.2 cm. Cephalic 

presentation at this time.

3. No gross fetal anatomic abnormality on this limited survey.

 

Electronically signed by: WANG HUSAIN MD (11/25/2019 5:38 PM) [facility]